# Patient Record
Sex: FEMALE | Race: OTHER | NOT HISPANIC OR LATINO | Employment: UNEMPLOYED | URBAN - METROPOLITAN AREA
[De-identification: names, ages, dates, MRNs, and addresses within clinical notes are randomized per-mention and may not be internally consistent; named-entity substitution may affect disease eponyms.]

---

## 2023-01-07 ENCOUNTER — HOSPITAL ENCOUNTER (EMERGENCY)
Facility: MEDICAL CENTER | Age: 48
End: 2023-01-07
Attending: EMERGENCY MEDICINE
Payer: COMMERCIAL

## 2023-01-07 VITALS
SYSTOLIC BLOOD PRESSURE: 128 MMHG | WEIGHT: 105 LBS | HEART RATE: 77 BPM | RESPIRATION RATE: 16 BRPM | DIASTOLIC BLOOD PRESSURE: 77 MMHG | OXYGEN SATURATION: 98 % | TEMPERATURE: 97.5 F

## 2023-01-07 DIAGNOSIS — G43.009 ATYPICAL MIGRAINE: ICD-10-CM

## 2023-01-07 DIAGNOSIS — R11.2 NAUSEA AND VOMITING, UNSPECIFIED VOMITING TYPE: ICD-10-CM

## 2023-01-07 DIAGNOSIS — R42 DIZZINESS: ICD-10-CM

## 2023-01-07 DIAGNOSIS — M54.2 NECK PAIN: ICD-10-CM

## 2023-01-07 LAB
ALBUMIN SERPL BCP-MCNC: 4.6 G/DL (ref 3.2–4.9)
ALBUMIN/GLOB SERPL: 1.7 G/DL
ALP SERPL-CCNC: 62 U/L (ref 30–99)
ALT SERPL-CCNC: 12 U/L (ref 2–50)
ANION GAP SERPL CALC-SCNC: 11 MMOL/L (ref 7–16)
AST SERPL-CCNC: 15 U/L (ref 12–45)
BASOPHILS # BLD AUTO: 1.3 % (ref 0–1.8)
BASOPHILS # BLD: 0.1 K/UL (ref 0–0.12)
BILIRUB SERPL-MCNC: 0.5 MG/DL (ref 0.1–1.5)
BUN SERPL-MCNC: 12 MG/DL (ref 8–22)
CALCIUM ALBUM COR SERPL-MCNC: 9 MG/DL (ref 8.5–10.5)
CALCIUM SERPL-MCNC: 9.5 MG/DL (ref 8.4–10.2)
CHLORIDE SERPL-SCNC: 105 MMOL/L (ref 96–112)
CO2 SERPL-SCNC: 23 MMOL/L (ref 20–33)
CREAT SERPL-MCNC: 0.65 MG/DL (ref 0.5–1.4)
EOSINOPHIL # BLD AUTO: 0.11 K/UL (ref 0–0.51)
EOSINOPHIL NFR BLD: 1.4 % (ref 0–6.9)
ERYTHROCYTE [DISTWIDTH] IN BLOOD BY AUTOMATED COUNT: 40.4 FL (ref 35.9–50)
GFR SERPLBLD CREATININE-BSD FMLA CKD-EPI: 109 ML/MIN/1.73 M 2
GLOBULIN SER CALC-MCNC: 2.7 G/DL (ref 1.9–3.5)
GLUCOSE SERPL-MCNC: 89 MG/DL (ref 65–99)
HCG SERPL QL: NEGATIVE
HCT VFR BLD AUTO: 38.1 % (ref 37–47)
HGB BLD-MCNC: 13.4 G/DL (ref 12–16)
IMM GRANULOCYTES # BLD AUTO: 0.01 K/UL (ref 0–0.11)
IMM GRANULOCYTES NFR BLD AUTO: 0.1 % (ref 0–0.9)
LIPASE SERPL-CCNC: 33 U/L (ref 7–58)
LYMPHOCYTES # BLD AUTO: 3.86 K/UL (ref 1–4.8)
LYMPHOCYTES NFR BLD: 49 % (ref 22–41)
MCH RBC QN AUTO: 31.3 PG (ref 27–33)
MCHC RBC AUTO-ENTMCNC: 35.2 G/DL (ref 33.6–35)
MCV RBC AUTO: 89 FL (ref 81.4–97.8)
MONOCYTES # BLD AUTO: 0.77 K/UL (ref 0–0.85)
MONOCYTES NFR BLD AUTO: 9.8 % (ref 0–13.4)
NEUTROPHILS # BLD AUTO: 3.02 K/UL (ref 2–7.15)
NEUTROPHILS NFR BLD: 38.4 % (ref 44–72)
NRBC # BLD AUTO: 0 K/UL
NRBC BLD-RTO: 0 /100 WBC
PLATELET # BLD AUTO: 321 K/UL (ref 164–446)
PMV BLD AUTO: 9.4 FL (ref 9–12.9)
POTASSIUM SERPL-SCNC: 3.5 MMOL/L (ref 3.6–5.5)
PROT SERPL-MCNC: 7.3 G/DL (ref 6–8.2)
RBC # BLD AUTO: 4.28 M/UL (ref 4.2–5.4)
SODIUM SERPL-SCNC: 139 MMOL/L (ref 135–145)
WBC # BLD AUTO: 7.9 K/UL (ref 4.8–10.8)

## 2023-01-07 PROCEDURE — 94760 N-INVAS EAR/PLS OXIMETRY 1: CPT

## 2023-01-07 PROCEDURE — 99284 EMERGENCY DEPT VISIT MOD MDM: CPT

## 2023-01-07 PROCEDURE — 36415 COLL VENOUS BLD VENIPUNCTURE: CPT

## 2023-01-07 PROCEDURE — 84703 CHORIONIC GONADOTROPIN ASSAY: CPT

## 2023-01-07 PROCEDURE — 83690 ASSAY OF LIPASE: CPT

## 2023-01-07 PROCEDURE — 96365 THER/PROPH/DIAG IV INF INIT: CPT

## 2023-01-07 PROCEDURE — 96375 TX/PRO/DX INJ NEW DRUG ADDON: CPT

## 2023-01-07 PROCEDURE — 80053 COMPREHEN METABOLIC PANEL: CPT

## 2023-01-07 PROCEDURE — 700105 HCHG RX REV CODE 258: Performed by: EMERGENCY MEDICINE

## 2023-01-07 PROCEDURE — 85025 COMPLETE CBC W/AUTO DIFF WBC: CPT

## 2023-01-07 PROCEDURE — 700111 HCHG RX REV CODE 636 W/ 250 OVERRIDE (IP): Performed by: EMERGENCY MEDICINE

## 2023-01-07 RX ORDER — PROCHLORPERAZINE MALEATE 10 MG
10 TABLET ORAL EVERY 6 HOURS PRN
Qty: 24 TABLET | Refills: 3 | Status: SHIPPED | OUTPATIENT
Start: 2023-01-07 | End: 2023-01-16

## 2023-01-07 RX ORDER — SODIUM CHLORIDE 9 MG/ML
1000 INJECTION, SOLUTION INTRAVENOUS ONCE
Status: COMPLETED | OUTPATIENT
Start: 2023-01-07 | End: 2023-01-07

## 2023-01-07 RX ORDER — KETOROLAC TROMETHAMINE 30 MG/ML
30 INJECTION, SOLUTION INTRAMUSCULAR; INTRAVENOUS ONCE
Status: COMPLETED | OUTPATIENT
Start: 2023-01-07 | End: 2023-01-07

## 2023-01-07 RX ORDER — PROCHLORPERAZINE EDISYLATE 5 MG/ML
10 INJECTION INTRAMUSCULAR; INTRAVENOUS ONCE
Status: COMPLETED | OUTPATIENT
Start: 2023-01-07 | End: 2023-01-07

## 2023-01-07 RX ORDER — KETOROLAC TROMETHAMINE 10 MG/1
10 TABLET, FILM COATED ORAL 3 TIMES DAILY PRN
Qty: 15 TABLET | Refills: 0 | Status: SHIPPED | OUTPATIENT
Start: 2023-01-07 | End: 2023-01-16

## 2023-01-07 RX ORDER — DIPHENHYDRAMINE HYDROCHLORIDE 50 MG/ML
50 INJECTION INTRAMUSCULAR; INTRAVENOUS ONCE
Status: COMPLETED | OUTPATIENT
Start: 2023-01-07 | End: 2023-01-07

## 2023-01-07 RX ADMIN — SODIUM CHLORIDE 1000 ML: 9 INJECTION, SOLUTION INTRAVENOUS at 20:45

## 2023-01-07 RX ADMIN — PROCHLORPERAZINE EDISYLATE 10 MG: 5 INJECTION INTRAMUSCULAR; INTRAVENOUS at 20:45

## 2023-01-07 RX ADMIN — KETOROLAC TROMETHAMINE 30 MG: 30 INJECTION, SOLUTION INTRAMUSCULAR at 20:45

## 2023-01-07 RX ADMIN — METHOCARBAMOL 1000 MG: 100 INJECTION INTRAMUSCULAR; INTRAVENOUS at 20:48

## 2023-01-07 RX ADMIN — DIPHENHYDRAMINE HYDROCHLORIDE 50 MG: 50 INJECTION INTRAMUSCULAR; INTRAVENOUS at 20:44

## 2023-01-07 ASSESSMENT — LIFESTYLE VARIABLES
CONSUMPTION TOTAL: INCOMPLETE
TOTAL SCORE: 0
HAVE YOU EVER FELT YOU SHOULD CUT DOWN ON YOUR DRINKING: NO
HAVE PEOPLE ANNOYED YOU BY CRITICIZING YOUR DRINKING: NO
EVER HAD A DRINK FIRST THING IN THE MORNING TO STEADY YOUR NERVES TO GET RID OF A HANGOVER: NO
DO YOU DRINK ALCOHOL: NO
EVER FELT BAD OR GUILTY ABOUT YOUR DRINKING: NO

## 2023-01-07 NOTE — Clinical Note
Discharge instructions with pt, pt verbalized understanding and provided with opportunity to ask questions, VSS, NAD, pt ambulatory with steady gait and aaox4

## 2023-01-08 NOTE — ED PROVIDER NOTES
"  ER Provider Note    Scribed for Sujatha Richter  by Osiris Canchola. 1/7/2023  8:08 PM    Primary Care Provider: No primary care provider noted.  Means of arrival: Walk-in  History obtained from: Patient    CHIEF COMPLAINT  Chief Complaint   Patient presents with    Headache     Pt  reports HA x 7 days     LIMITATION TO HISTORY   Select: : None    HPI  OUTSIDE HISTORIAN(S):  Select: None    EXTERNAL RECORDS REVIEWED  Select: Other None    Bita Chaudhry is a 47 y.o. female with a history of migraines who presents to the ED for a left sided migraine onset 7 days ago. Patent feels like \"someone is drilling into [her] temple.\" Her pain is one-sided. The worst migraines she has had before this as when she was bedridden but this current pain \"blows her previous migraines out of the water.\"  She describes that her body starts convulsing and she feels like she will black out because of her pain. She has associated left sided facial numbness and nausea, but denies fever. She has attempted to alleviate with tylenol, but has experienced no relief. She notes that she has a history of head injuries secondary to playing sports. Denies allergies to any medications.     ROS  Pertinent positives include left sided migraine, left sided facial numbness and nausea. Pertinent negatives include no fever.  All other systems reviewed and negative.      PAST MEDICAL HISTORY  Migraine headaches  Chronic neck and back pain    SURGICAL HISTORY  None noted     FAMILY HISTORY  None noted     SOCIAL HISTORY   None noted     CURRENT MEDICATIONS  No current outpatient medications     ALLERGIES  Patient has no allergy information on record.    PHYSICAL EXAM  Wt 49.2 kg (108 lb 7.5 oz)     Constitutional: Moderate to severe distress, Tearful, and Anxious. Patient is well developed, well nourished. Non-toxic appearing.   HENT: Normocephalic, atraumatic. Nose normal with no mucosal edema or drainage. Oropharynx moist without erythema or " exudates.  Eyes: Conjunctivus slightly erythematous on bilateral eyes with no drainage, PERRL, EOMI,   Neck: Supple with Normal range of motion in flexion, extension and lateral rotation. No tenderness along the bony prominences or paraspinal muscles.   Lymphatic: No lymphadenopathy noted.   Cardiovascular: Tachycardic heart rate and Regular rhythm. No murmur  Thorax & Lungs: Clear and equal breath sounds with good excursion. No respiratory distress  Abdomen: Bowel sounds normal in all four quadrants. Soft,nontender, no rebound , guarding, palpable masses.   Skin: Warm, Dry, No rashes.   Back: Tenderness in the left cervical paraspinal muscles extending into the left occipital region, No cervical, thoracic, or lumbosacral tenderness.   Extremities: Peripheral pulses 4/4   Musculoskeletal: Normal range of motion in all major joints. No tenderness to palpation or major deformities noted.   Neurologic: Subjective left facial paraesthesia with normal  strength, Alert & oriented x 3, Normal motor function, Normal sensory function, No lateralizing or focal deficits noted. DTR's 4/4 bilaterally.  Psychiatric: Affect odd, very anxious    DIAGNOSTIC STUDIES & PROCEDURES    Labs:   Results for orders placed or performed during the hospital encounter of 01/07/23   CBC WITH DIFFERENTIAL   Result Value Ref Range    WBC 7.9 4.8 - 10.8 K/uL    RBC 4.28 4.20 - 5.40 M/uL    Hemoglobin 13.4 12.0 - 16.0 g/dL    Hematocrit 38.1 37.0 - 47.0 %    MCV 89.0 81.4 - 97.8 fL    MCH 31.3 27.0 - 33.0 pg    MCHC 35.2 (H) 33.6 - 35.0 g/dL    RDW 40.4 35.9 - 50.0 fL    Platelet Count 321 164 - 446 K/uL    MPV 9.4 9.0 - 12.9 fL    Neutrophils-Polys 38.40 (L) 44.00 - 72.00 %    Lymphocytes 49.00 (H) 22.00 - 41.00 %    Monocytes 9.80 0.00 - 13.40 %    Eosinophils 1.40 0.00 - 6.90 %    Basophils 1.30 0.00 - 1.80 %    Immature Granulocytes 0.10 0.00 - 0.90 %    Nucleated RBC 0.00 /100 WBC    Neutrophils (Absolute) 3.02 2.00 - 7.15 K/uL    Lymphs  (Absolute) 3.86 1.00 - 4.80 K/uL    Monos (Absolute) 0.77 0.00 - 0.85 K/uL    Eos (Absolute) 0.11 0.00 - 0.51 K/uL    Baso (Absolute) 0.10 0.00 - 0.12 K/uL    Immature Granulocytes (abs) 0.01 0.00 - 0.11 K/uL    NRBC (Absolute) 0.00 K/uL   COMP METABOLIC PANEL   Result Value Ref Range    Sodium 139 135 - 145 mmol/L    Potassium 3.5 (L) 3.6 - 5.5 mmol/L    Chloride 105 96 - 112 mmol/L    Co2 23 20 - 33 mmol/L    Anion Gap 11.0 7.0 - 16.0    Glucose 89 65 - 99 mg/dL    Bun 12 8 - 22 mg/dL    Creatinine 0.65 0.50 - 1.40 mg/dL    Calcium 9.5 8.4 - 10.2 mg/dL    AST(SGOT) 15 12 - 45 U/L    ALT(SGPT) 12 2 - 50 U/L    Alkaline Phosphatase 62 30 - 99 U/L    Total Bilirubin 0.5 0.1 - 1.5 mg/dL    Albumin 4.6 3.2 - 4.9 g/dL    Total Protein 7.3 6.0 - 8.2 g/dL    Globulin 2.7 1.9 - 3.5 g/dL    A-G Ratio 1.7 g/dL   LIPASE   Result Value Ref Range    Lipase 33 7 - 58 U/L   HCG QUAL SERUM   Result Value Ref Range    Beta-Hcg Qualitative Serum Negative Negative   CORRECTED CALCIUM   Result Value Ref Range    Correct Calcium 9.0 8.5 - 10.5 mg/dL   ESTIMATED GFR   Result Value Ref Range    GFR (CKD-EPI) 109 >60 mL/min/1.73 m 2     All labs reviewed by me.       COURSE & MEDICAL DECISION MAKING    8:08 PM - Patient seen and evaluated at bedside. I informed the patient I will order labs to evaluate and medications to treat. Patient verbalizes understanding and agreement to this plan of care. Patient will be treated with NS infusion 1,000 mL, Robaxin 1,000 mg in  mL IVPB, Toradol 30 mg via injection, Compazine 10 mg via injection, and Benadryl 50 mg via injection for her symptoms. Ordered Lipase, CMP, and CBC w/ Diff to evaluate. Differential diagnoses include but are not limited to: migraine, atypical migraine, tension head ache     8:30 PM - Patient notes she is feeling much better and has finally been able to sleep with treatment.    9:58 PM - Patient notes she feels better but cannot give a percentage of how much she feels  better. I informed her of plans for discharge. Patient had the opportunity to ask any questions. The plan for discharge was discussed with them and she was told to return for any new or worsening symptoms. She was also informed of the plans for follow up. Patient is understanding and agreeable to the plan for discharge.     ED Observation Status? No; Patient does not meet criteria for ED Observation.     INITIAL ASSESSMENT AND PLAN  Care Narrative: Patient received an IV with migraine cocktail including Benadryl, Compazine, Toradol and Robaxin.  Her laboratories were drawn to rule out any type of liver injury secondary to too much recent Tylenol ingestion.  Her labs were all unremarkable.  She was able to fall asleep and was resting comfortably.  Her headache was completely gone upon recheck and she will be discharged home with prescriptions for Toradol and Compazine.  She is also to call and get an appointment with a primary care physician for further evaluation and treatment.  She is discharged in stable and improved condition.    ADDITIONAL PROBLEM LIST AND DISPOSITION      Decision tools and prescription drugs considered including, but not limited to: Antiemetics and anti-inflammatories    HYDRATION: Based on the patient's presentation of Acute Vomiting the patient was given IV fluids. IV Hydration was used because oral hydration was not adequate alone. Upon recheck following hydration, the patient was improved.    The patient will return for new or worsening symptoms and is stable at the time of discharge.    The patient is referred to a primary physician for blood pressure management, diabetic screening, and for all other preventative health concerns.    DISPOSITION:  Patient will be discharged home in stable condition.    FOLLOW UP:  [facility]  [address]  [place] [address]  [phone]  Schedule an appointment as soon as possible for a visit in 2 days  To establish a primary  care physician      OUTPATIENT MEDICATIONS:  Toradol, Compazine       FINAL IMPRESSION   1. Atypical migraine    2. Dizziness    3. Neck pain    4. Nausea and vomiting, unspecified vomiting type         I, Osiris Canchola (Leena), am scribing for, and in the presence of, Sujatha Richter D.O..    Electronically signed by: Osiris Canchola (Scribe), 1/7/2023    I, Sujatha Richter D.O. personally performed the services described in this documentation, as scribed by Osiris Canchola in my presence, and it is both accurate and complete. C    The note accurately reflects work and decisions made by me.  Sujatha Richter D.O.  1/8/2023  12:00 AM

## 2023-01-08 NOTE — DISCHARGE INSTRUCTIONS
Migraine headaches can often times cause strokelike symptoms.  Please take the medications I will be prescribing you as directed with food and as needed  Call first thing Monday morning to schedule an appointment with a primary care provider so that you have follow-up should things worsen.  And for further medications.  Make sure that you are getting plenty of rest, increase fluids, use Biofreeze and moist heat to your neck  Return if worsening.

## 2023-01-13 ENCOUNTER — TELEPHONE (OUTPATIENT)
Dept: SCHEDULING | Facility: IMAGING CENTER | Age: 48
End: 2023-01-13

## 2023-01-16 ENCOUNTER — HOSPITAL ENCOUNTER (OUTPATIENT)
Dept: RADIOLOGY | Facility: MEDICAL CENTER | Age: 48
End: 2023-01-16
Attending: STUDENT IN AN ORGANIZED HEALTH CARE EDUCATION/TRAINING PROGRAM
Payer: COMMERCIAL

## 2023-01-16 ENCOUNTER — OFFICE VISIT (OUTPATIENT)
Dept: MEDICAL GROUP | Facility: MEDICAL CENTER | Age: 48
End: 2023-01-16
Payer: COMMERCIAL

## 2023-01-16 VITALS
SYSTOLIC BLOOD PRESSURE: 110 MMHG | TEMPERATURE: 97.9 F | HEIGHT: 60 IN | HEART RATE: 85 BPM | DIASTOLIC BLOOD PRESSURE: 70 MMHG | OXYGEN SATURATION: 98 % | RESPIRATION RATE: 17 BRPM | BODY MASS INDEX: 21.3 KG/M2 | WEIGHT: 108.47 LBS

## 2023-01-16 DIAGNOSIS — R13.19 OTHER DYSPHAGIA: ICD-10-CM

## 2023-01-16 DIAGNOSIS — R26.89 BALANCE PROBLEMS: ICD-10-CM

## 2023-01-16 DIAGNOSIS — Z80.41 FAMILY HISTORY OF OVARIAN CANCER: ICD-10-CM

## 2023-01-16 DIAGNOSIS — B97.7 HPV IN FEMALE: ICD-10-CM

## 2023-01-16 DIAGNOSIS — G89.29 OTHER CHRONIC PAIN: ICD-10-CM

## 2023-01-16 DIAGNOSIS — G43.109 MIGRAINE WITH AURA AND WITHOUT STATUS MIGRAINOSUS, NOT INTRACTABLE: ICD-10-CM

## 2023-01-16 DIAGNOSIS — H53.9 VISION CHANGES: ICD-10-CM

## 2023-01-16 DIAGNOSIS — Z00.00 ENCOUNTER FOR MEDICAL EXAMINATION TO ESTABLISH CARE: ICD-10-CM

## 2023-01-16 PROCEDURE — 700117 HCHG RX CONTRAST REV CODE 255: Performed by: STUDENT IN AN ORGANIZED HEALTH CARE EDUCATION/TRAINING PROGRAM

## 2023-01-16 PROCEDURE — 70553 MRI BRAIN STEM W/O & W/DYE: CPT

## 2023-01-16 PROCEDURE — 99204 OFFICE O/P NEW MOD 45 MIN: CPT | Performed by: STUDENT IN AN ORGANIZED HEALTH CARE EDUCATION/TRAINING PROGRAM

## 2023-01-16 PROCEDURE — A9579 GAD-BASE MR CONTRAST NOS,1ML: HCPCS | Performed by: STUDENT IN AN ORGANIZED HEALTH CARE EDUCATION/TRAINING PROGRAM

## 2023-01-16 RX ORDER — AMOXICILLIN 500 MG/1
CAPSULE ORAL
COMMUNITY
Start: 2023-01-03 | End: 2023-01-16

## 2023-01-16 RX ADMIN — GADOTERIDOL 10 ML: 279.3 INJECTION, SOLUTION INTRAVENOUS at 15:54

## 2023-01-16 ASSESSMENT — FIBROSIS 4 INDEX: FIB4 SCORE: 0.62

## 2023-01-16 ASSESSMENT — PATIENT HEALTH QUESTIONNAIRE - PHQ9: CLINICAL INTERPRETATION OF PHQ2 SCORE: 2
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
## 2023-01-17 ENCOUNTER — TELEPHONE (OUTPATIENT)
Dept: MEDICAL GROUP | Facility: MEDICAL CENTER | Age: 48
End: 2023-01-17
Payer: COMMERCIAL

## 2023-01-17 NOTE — TELEPHONE ENCOUNTER
----- Message from Joana Joe M.D. sent at 1/17/2023  6:51 AM PST -----  Please let patient know that I got the results of her MRI brain with and without contrast.  There are some small abnormalities that are nonspecific.  This could be related to small areas of damage due to blood vessel issues, could be related to chronic migraines or headache syndromes, or other nonspecific issues.  Otherwise it is normal.  I would make sure she is able to go over this with her neurologist to get a better idea of what this means.    Thank You,  Dr. Joe

## 2023-01-17 NOTE — TELEPHONE ENCOUNTER
Phone Number Called: [phone] (home)      Call outcome: Spoke to patient regarding message below.    Message: spoke to PT and informed her the results of her MRI brain with and without contrast.  There are some small abnormalities that are nonspecific.  This could be related to small areas of damage due to blood vessel issues, could be related to chronic migraines or headache syndromes, or other nonspecific issues.  Otherwise it is normal.

## 2023-01-24 ENCOUNTER — TELEPHONE (OUTPATIENT)
Dept: NEUROLOGY | Facility: MEDICAL CENTER | Age: 48
End: 2023-01-24
Payer: COMMERCIAL

## 2023-01-31 ENCOUNTER — TELEPHONE (OUTPATIENT)
Dept: NEUROLOGY | Facility: MEDICAL CENTER | Age: 48
End: 2023-01-31
Payer: COMMERCIAL

## 2023-02-03 ENCOUNTER — TELEPHONE (OUTPATIENT)
Dept: NEUROLOGY | Facility: MEDICAL CENTER | Age: 48
End: 2023-02-03
Payer: COMMERCIAL

## 2023-02-06 ENCOUNTER — OFFICE VISIT (OUTPATIENT)
Dept: NEUROLOGY | Facility: MEDICAL CENTER | Age: 48
End: 2023-02-06
Attending: PSYCHIATRY & NEUROLOGY
Payer: COMMERCIAL

## 2023-02-06 VITALS
OXYGEN SATURATION: 98 % | TEMPERATURE: 97.4 F | BODY MASS INDEX: 21.1 KG/M2 | SYSTOLIC BLOOD PRESSURE: 124 MMHG | WEIGHT: 108.03 LBS | DIASTOLIC BLOOD PRESSURE: 76 MMHG

## 2023-02-06 DIAGNOSIS — G95.9 DISEASE OF SPINAL CORD (HCC): ICD-10-CM

## 2023-02-06 DIAGNOSIS — R90.82 WHITE MATTER ABNORMALITY ON MRI OF BRAIN: ICD-10-CM

## 2023-02-06 DIAGNOSIS — R56.9 SEIZURE (HCC): ICD-10-CM

## 2023-02-06 DIAGNOSIS — G43.109 MIGRAINE WITH AURA AND WITHOUT STATUS MIGRAINOSUS, NOT INTRACTABLE: ICD-10-CM

## 2023-02-06 DIAGNOSIS — S09.90XS HEAD TRAUMA, SEQUELA: ICD-10-CM

## 2023-02-06 DIAGNOSIS — H53.2 DIPLOPIA: ICD-10-CM

## 2023-02-06 DIAGNOSIS — R13.10 DYSPHAGIA, UNSPECIFIED TYPE: ICD-10-CM

## 2023-02-06 DIAGNOSIS — R26.89 BALANCE PROBLEMS: ICD-10-CM

## 2023-02-06 DIAGNOSIS — N32.81 OVERACTIVE BLADDER: ICD-10-CM

## 2023-02-06 PROCEDURE — 99211 OFF/OP EST MAY X REQ PHY/QHP: CPT | Performed by: PSYCHIATRY & NEUROLOGY

## 2023-02-06 PROCEDURE — 99205 OFFICE O/P NEW HI 60 MIN: CPT | Performed by: PSYCHIATRY & NEUROLOGY

## 2023-02-06 ASSESSMENT — FIBROSIS 4 INDEX: FIB4 SCORE: 0.62

## 2023-02-06 NOTE — ASSESSMENT & PLAN NOTE
Patient with left facial numbness with her headaches. Pt takes OTC tylenol and ibuprofen and drinking water. She has taken sumatriptan in the past and she had bad SE. Pt uses some CBD and herbal remedies.

## 2023-02-06 NOTE — ASSESSMENT & PLAN NOTE
Pt has swallowing issues with all food that used to come and go but is constant now.  Patient states she has lost weight as she has had difficulty with any food that is difficult to chew and swallow.  Never had a swallowing study.

## 2023-02-06 NOTE — ASSESSMENT & PLAN NOTE
Pt states that she has had 3 events that she felt a strange feeling and she had muscle spasms and clawing in her hand.
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
## 2023-02-07 NOTE — ASSESSMENT & PLAN NOTE
Patient had a recent scan for severe headaches and imbalance issues in January 2023 which did show white matter changes which need to be further investigated.
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
## 2023-02-17 ENCOUNTER — TELEPHONE (OUTPATIENT)
Dept: NEUROLOGY | Facility: MEDICAL CENTER | Age: 48
End: 2023-02-17
Payer: COMMERCIAL

## 2023-02-17 NOTE — TELEPHONE ENCOUNTER
VOICEMAIL  1. Caller Name: Jessy ALDANA                      Call Back Number: [phone]    2. Message: Patient is wanting a referral to [facility] neurology in [place] with Meena Dang due to issues with her insurance not being covered in [place]. Please let me know once done so I can let the patient know.   Thank you!  -DANIEL Alas

## 2023-02-18 DIAGNOSIS — R90.82 WHITE MATTER ABNORMALITY ON MRI OF BRAIN: ICD-10-CM

## 2023-02-19 ENCOUNTER — APPOINTMENT (OUTPATIENT)
Dept: RADIOLOGY | Facility: MEDICAL CENTER | Age: 48
End: 2023-02-19
Attending: PSYCHIATRY & NEUROLOGY
Payer: COMMERCIAL

## 2023-04-25 ENCOUNTER — TELEPHONE (OUTPATIENT)
Dept: NEUROLOGY | Facility: MEDICAL CENTER | Age: 48
End: 2023-04-25
Payer: COMMERCIAL

## 2023-04-25 NOTE — TELEPHONE ENCOUNTER
[phone]  Reina from [facility] called states pt was referred to [facility] and they denied the referral because they don't have enough staff.   Asking if you would know where else this pt can be sent to for her MS.   Has tried contacting pt, however she is not responding calls.  Please advise

## 2023-05-16 DIAGNOSIS — R90.82 WHITE MATTER ABNORMALITY ON MRI OF BRAIN: ICD-10-CM

## 2024-04-22 ENCOUNTER — HOSPITAL ENCOUNTER (EMERGENCY)
Facility: MEDICAL CENTER | Age: 49
End: 2024-04-22
Attending: EMERGENCY MEDICINE
Payer: COMMERCIAL

## 2024-04-22 ENCOUNTER — APPOINTMENT (OUTPATIENT)
Dept: RADIOLOGY | Facility: MEDICAL CENTER | Age: 49
End: 2024-04-22
Attending: EMERGENCY MEDICINE
Payer: COMMERCIAL

## 2024-04-22 VITALS
DIASTOLIC BLOOD PRESSURE: 70 MMHG | HEART RATE: 115 BPM | SYSTOLIC BLOOD PRESSURE: 102 MMHG | HEIGHT: 60 IN | OXYGEN SATURATION: 98 % | RESPIRATION RATE: 18 BRPM | TEMPERATURE: 98.5 F | WEIGHT: 104.72 LBS | BODY MASS INDEX: 20.56 KG/M2

## 2024-04-22 PROCEDURE — 99283 EMERGENCY DEPT VISIT LOW MDM: CPT

## 2024-04-22 PROCEDURE — 700102 HCHG RX REV CODE 250 W/ 637 OVERRIDE(OP): Performed by: EMERGENCY MEDICINE

## 2024-04-22 PROCEDURE — A9270 NON-COVERED ITEM OR SERVICE: HCPCS | Performed by: EMERGENCY MEDICINE

## 2024-04-22 PROCEDURE — 73110 X-RAY EXAM OF WRIST: CPT | Mod: RT

## 2024-04-22 RX ORDER — GABAPENTIN 100 MG/1
200 CAPSULE ORAL ONCE
Status: COMPLETED | OUTPATIENT
Start: 2024-04-22 | End: 2024-04-22

## 2024-04-22 RX ORDER — MELOXICAM 7.5 MG/1
7.5 TABLET ORAL ONCE
Status: COMPLETED | OUTPATIENT
Start: 2024-04-22 | End: 2024-04-22

## 2024-04-22 RX ADMIN — GABAPENTIN 200 MG: 100 CAPSULE ORAL at 21:15

## 2024-04-22 RX ADMIN — MELOXICAM 7.5 MG: 7.5 TABLET ORAL at 21:15

## 2024-04-22 ASSESSMENT — FIBROSIS 4 INDEX: FIB4 SCORE: 0.63

## 2024-04-23 NOTE — ED PROVIDER NOTES
ED Provider Note    CHIEF COMPLAINT  Chief Complaint   Patient presents with    Wrist Pain     Right wrist pain. Atraumatic. Patient reports previous surgery and nerve damage at site.          HPI/ROS    Bita Chaudhry is a 48 y.o. female who presents with right wrist pain.  Patient reports longstanding history of chronic pain in the wrist.  Patient reports that she has had pain to the right wrist for 3 years now.  She broke her wrist and had some pins placed years ago and since then the pain has been present.  She reports the pain is progressively worsened over the last few years, she does not have a primary care physician therefore came to the emergency department for this.  She does have a neurologist that she has a history of seizures.  She also has some chronic muscle spasms for which she is on muscle relaxers.  Patient denies any new weakness or numbness of her hand.  She denies any fevers or chills.  She denies any recent trauma.    PAST MEDICAL HISTORY       SURGICAL HISTORY  patient denies any surgical history    FAMILY HISTORY  Family History   Problem Relation Age of Onset    Ovarian Cancer Mother     Cancer Father     Diabetes Paternal Uncle     Tubal Cancer Neg Hx     Peritoneal Cancer Neg Hx     Colorectal Cancer Neg Hx     Breast Cancer Neg Hx        SOCIAL HISTORY  Social History     Tobacco Use    Smoking status: Never    Smokeless tobacco: Never   Vaping Use    Vaping Use: Never used   Substance and Sexual Activity    Alcohol use: Not Currently     Comment: occ    Drug use: Yes     Types: Marijuana    Sexual activity: Yes     Partners: Male     Birth control/protection: Coitus Interruptus       CURRENT MEDICATIONS  Home Medications       Reviewed by Juan Antonio Lynch R.N. (Registered Nurse) on 04/22/24 at 2001  Med List Status: Not Addressed     Medication Last Dose Status        Patient Kj Taking any Medications                           ALLERGIES  No Known Allergies    PHYSICAL  EXAM  VITAL SIGNS: /70   Pulse (!) 115   Temp 36.9 °C (98.5 °F) (Temporal)   Resp 18   Ht 1.524 m (5')   Wt 47.5 kg (104 lb 11.5 oz)   LMP 01/17/2024 (Approximate)   SpO2 98%   BMI 20.45 kg/m²    Physical Exam  Constitutional:       Appearance: Normal appearance.   HENT:      Mouth/Throat:      Mouth: Mucous membranes are moist.   Pulmonary:      Effort: Pulmonary effort is normal.   Musculoskeletal:      Comments: Mild ttp at R wrist, FROM of wrist without much pain revoked, no increased warmth or overlying skin changes. distal pulses 2+   Neurological:      General: No focal deficit present.      Mental Status: She is alert and oriented to person, place, and time.   Psychiatric:         Mood and Affect: Mood normal.             RADIOLOGY/PROCEDURES   I have independently interpreted the diagnostic imaging associated with this visit and am waiting the final reading from the radiologist.   My preliminary interpretation is as follows: No evidence of concerning findings    Radiologist interpretation:  DX-WRIST-COMPLETE 3+ RIGHT   Final Result         1.  No acute traumatic bony injury.            COURSE & MEDICAL DECISION MAKING    ASSESSMENT, COURSE AND PLAN  Care Narrative: Patient here with chronic wrist pain.  Will check x-rays to help facilitate further workup.  My suspicion of septic arthritis is very low given patient with full range of motion of her wrist.  No increased warmth intravenously of her symptoms.  I checked an x-ray, plan was to have patient follow-up with orthopedics and send her home with some Mobic.  Shortly after the x-ray was completed patient absconded from the emergency department thankfully x-ray failed to reveal any concerning findings            DISPOSITION AND DISCUSSIONS      Escalation of care considered, and ultimately not performed: Patient currently does not have a primary care physician though is scheduled to establish  with 1 in 2 weeks    Barriers to care at this  time, including but not limited to: Left AGAINST MEDICAL ADVICE prior to being able to complete the workup       FINAL DIAGNOSIS  Chronic wrist pain

## 2024-04-23 NOTE — ED TRIAGE NOTES
Patient presents to the ER with the following complaints:    Chief Complaint   Patient presents with    Wrist Pain     Right wrist pain. Atraumatic. Patient reports previous surgery and nerve damage at site.        /70   Pulse (!) 115   Temp 36.9 °C (98.5 °F) (Temporal)   Resp 18   Ht 1.524 m (5')   Wt 47.5 kg (104 lb 11.5 oz)   LMP 01/17/2024 (Approximate)   SpO2 98%   BMI 20.45 kg/m²

## 2024-08-26 ENCOUNTER — APPOINTMENT (OUTPATIENT)
Dept: NEUROLOGY | Facility: MEDICAL CENTER | Age: 49
End: 2024-08-26
Attending: PSYCHIATRY & NEUROLOGY
Payer: COMMERCIAL

## 2024-09-13 ENCOUNTER — HOSPITAL ENCOUNTER (OUTPATIENT)
Facility: MEDICAL CENTER | Age: 49
End: 2024-09-13
Attending: EMERGENCY MEDICINE | Admitting: INTERNAL MEDICINE
Payer: COMMERCIAL

## 2024-09-13 ENCOUNTER — APPOINTMENT (OUTPATIENT)
Dept: RADIOLOGY | Facility: MEDICAL CENTER | Age: 49
End: 2024-09-13
Attending: EMERGENCY MEDICINE
Payer: COMMERCIAL

## 2024-09-13 VITALS
WEIGHT: 102.73 LBS | HEART RATE: 88 BPM | RESPIRATION RATE: 20 BRPM | TEMPERATURE: 98.7 F | BODY MASS INDEX: 20.17 KG/M2 | OXYGEN SATURATION: 98 % | HEIGHT: 60 IN | DIASTOLIC BLOOD PRESSURE: 89 MMHG | SYSTOLIC BLOOD PRESSURE: 134 MMHG

## 2024-09-13 DIAGNOSIS — R13.10 DYSPHAGIA, UNSPECIFIED TYPE: ICD-10-CM

## 2024-09-13 DIAGNOSIS — R07.9 ACUTE CHEST PAIN: ICD-10-CM

## 2024-09-13 LAB
ALBUMIN SERPL BCP-MCNC: 4.3 G/DL (ref 3.2–4.9)
ALBUMIN/GLOB SERPL: 1.6 G/DL
ALP SERPL-CCNC: 63 U/L (ref 30–99)
ALT SERPL-CCNC: 8 U/L (ref 2–50)
ANION GAP SERPL CALC-SCNC: 13 MMOL/L (ref 7–16)
AST SERPL-CCNC: 15 U/L (ref 12–45)
BASOPHILS # BLD AUTO: 1.4 % (ref 0–1.8)
BASOPHILS # BLD: 0.12 K/UL (ref 0–0.12)
BILIRUB SERPL-MCNC: 0.7 MG/DL (ref 0.1–1.5)
BUN SERPL-MCNC: 9 MG/DL (ref 8–22)
CALCIUM ALBUM COR SERPL-MCNC: 9 MG/DL (ref 8.5–10.5)
CALCIUM SERPL-MCNC: 9.2 MG/DL (ref 8.4–10.2)
CHLORIDE SERPL-SCNC: 101 MMOL/L (ref 96–112)
CO2 SERPL-SCNC: 23 MMOL/L (ref 20–33)
CREAT SERPL-MCNC: 0.57 MG/DL (ref 0.5–1.4)
EKG IMPRESSION: NORMAL
EOSINOPHIL # BLD AUTO: 0.15 K/UL (ref 0–0.51)
EOSINOPHIL NFR BLD: 1.7 % (ref 0–6.9)
ERYTHROCYTE [DISTWIDTH] IN BLOOD BY AUTOMATED COUNT: 37.8 FL (ref 35.9–50)
GFR SERPLBLD CREATININE-BSD FMLA CKD-EPI: 112 ML/MIN/1.73 M 2
GLOBULIN SER CALC-MCNC: 2.7 G/DL (ref 1.9–3.5)
GLUCOSE SERPL-MCNC: 87 MG/DL (ref 65–99)
HCG SERPL QL: NEGATIVE
HCT VFR BLD AUTO: 36.9 % (ref 37–47)
HGB BLD-MCNC: 13.3 G/DL (ref 12–16)
IMM GRANULOCYTES # BLD AUTO: 0.02 K/UL (ref 0–0.11)
IMM GRANULOCYTES NFR BLD AUTO: 0.2 % (ref 0–0.9)
LIPASE SERPL-CCNC: 25 U/L (ref 11–82)
LYMPHOCYTES # BLD AUTO: 3.75 K/UL (ref 1–4.8)
LYMPHOCYTES NFR BLD: 42.3 % (ref 22–41)
MCH RBC QN AUTO: 32 PG (ref 27–33)
MCHC RBC AUTO-ENTMCNC: 36 G/DL (ref 32.2–35.5)
MCV RBC AUTO: 88.7 FL (ref 81.4–97.8)
MONOCYTES # BLD AUTO: 0.66 K/UL (ref 0–0.85)
MONOCYTES NFR BLD AUTO: 7.4 % (ref 0–13.4)
NEUTROPHILS # BLD AUTO: 4.16 K/UL (ref 1.82–7.42)
NEUTROPHILS NFR BLD: 47 % (ref 44–72)
NRBC # BLD AUTO: 0 K/UL
NRBC BLD-RTO: 0 /100 WBC (ref 0–0.2)
PLATELET # BLD AUTO: 402 K/UL (ref 164–446)
PMV BLD AUTO: 9.3 FL (ref 9–12.9)
POTASSIUM SERPL-SCNC: 3.7 MMOL/L (ref 3.6–5.5)
PROT SERPL-MCNC: 7 G/DL (ref 6–8.2)
RBC # BLD AUTO: 4.16 M/UL (ref 4.2–5.4)
SODIUM SERPL-SCNC: 137 MMOL/L (ref 135–145)
WBC # BLD AUTO: 8.9 K/UL (ref 4.8–10.8)

## 2024-09-13 PROCEDURE — 700105 HCHG RX REV CODE 258: Performed by: EMERGENCY MEDICINE

## 2024-09-13 PROCEDURE — 93005 ELECTROCARDIOGRAM TRACING: CPT

## 2024-09-13 PROCEDURE — 84703 CHORIONIC GONADOTROPIN ASSAY: CPT

## 2024-09-13 PROCEDURE — 36415 COLL VENOUS BLD VENIPUNCTURE: CPT

## 2024-09-13 PROCEDURE — 99222 1ST HOSP IP/OBS MODERATE 55: CPT | Performed by: INTERNAL MEDICINE

## 2024-09-13 PROCEDURE — 71045 X-RAY EXAM CHEST 1 VIEW: CPT

## 2024-09-13 PROCEDURE — 80053 COMPREHEN METABOLIC PANEL: CPT

## 2024-09-13 PROCEDURE — 83690 ASSAY OF LIPASE: CPT

## 2024-09-13 PROCEDURE — 85025 COMPLETE CBC W/AUTO DIFF WBC: CPT

## 2024-09-13 PROCEDURE — 93005 ELECTROCARDIOGRAM TRACING: CPT | Performed by: EMERGENCY MEDICINE

## 2024-09-13 PROCEDURE — 99284 EMERGENCY DEPT VISIT MOD MDM: CPT

## 2024-09-13 PROCEDURE — G0378 HOSPITAL OBSERVATION PER HR: HCPCS

## 2024-09-13 RX ORDER — CYCLOBENZAPRINE HCL 10 MG
20 TABLET ORAL 2 TIMES DAILY PRN
COMMUNITY

## 2024-09-13 RX ORDER — SODIUM CHLORIDE, SODIUM LACTATE, POTASSIUM CHLORIDE, CALCIUM CHLORIDE 600; 310; 30; 20 MG/100ML; MG/100ML; MG/100ML; MG/100ML
1000 INJECTION, SOLUTION INTRAVENOUS ONCE
Status: COMPLETED | OUTPATIENT
Start: 2024-09-13 | End: 2024-09-13

## 2024-09-13 RX ORDER — HYDROXYZINE HYDROCHLORIDE 25 MG/1
25 TABLET, FILM COATED ORAL EVERY 6 HOURS PRN
COMMUNITY

## 2024-09-13 RX ORDER — GABAPENTIN 300 MG/1
300 CAPSULE ORAL 3 TIMES DAILY PRN
COMMUNITY

## 2024-09-13 RX ORDER — LORAZEPAM 1 MG/1
1 TABLET ORAL 2 TIMES DAILY PRN
COMMUNITY

## 2024-09-13 RX ADMIN — SODIUM CHLORIDE, POTASSIUM CHLORIDE, SODIUM LACTATE AND CALCIUM CHLORIDE 1000 ML: 600; 310; 30; 20 INJECTION, SOLUTION INTRAVENOUS at 20:14

## 2024-09-13 ASSESSMENT — ENCOUNTER SYMPTOMS
ROS GI COMMENTS: DIFFICULTY SWALLOWING
SPEECH CHANGE: 1
VOMITING: 0
SPUTUM PRODUCTION: 0
ABDOMINAL PAIN: 0
HEADACHES: 0
DIZZINESS: 0
SENSORY CHANGE: 1
CONSTIPATION: 0
CHILLS: 0
NAUSEA: 0
COUGH: 0
DEPRESSION: 0
TINGLING: 0
PALPITATIONS: 0
FALLS: 0
DIARRHEA: 0
MYALGIAS: 0
LOSS OF CONSCIOUSNESS: 0
STRIDOR: 0
SHORTNESS OF BREATH: 0
FEVER: 0
WEAKNESS: 1

## 2024-09-13 ASSESSMENT — FIBROSIS 4 INDEX: FIB4 SCORE: 0.65

## 2024-09-14 PROBLEM — R53.1 WEAKNESS: Status: ACTIVE | Noted: 2024-09-14

## 2024-09-14 NOTE — CONSULTS
Hospital Medicine Consultation    Date of Service  9/13/2024    Referring Physician  Km Banda D.O.    Consulting Physician  Km Banda D.O.    Reason for Consultation  Admission for dysphagia    History of Presenting Illness  49 y.o. female who presented 9/13/2024 with ongoing dysphagia and concern for aspiration.  Patient began developing dysphagia approximately 7 years ago.  She did have a barium swallow 1 year ago and was officially diagnosed with dysphagia.  She states she has not been given a diagnosis as to why.  She has seen neurology, initially MS was looked at.  She states she has an appointment next week to see a specialist and myasthenia gravis.  Patient states her insurance is in [place] which is where she seeks most of her care.  She came into the ER today for concerns of aspiration.  She states 2 days ago she have difficulty swallowing and then began coughing what she had tried to eat, also noted it coming out of her nose.  After having a long discussion about her case with her, she decided she wanted to see her neurologist first prior to any endoscopy.  She does believe this is secondary to a neurologic issue.  Patient did not want to be admitted, since she was not interested in an EGD, there was no need for admission.  I did discuss the case including labs and imaging with the ER physician    Review of Systems  Review of Systems   Constitutional:  Negative for chills, fever and malaise/fatigue.   HENT:  Negative for congestion.    Respiratory:  Negative for cough, sputum production, shortness of breath and stridor.    Cardiovascular:  Positive for chest pain. Negative for palpitations and leg swelling.   Gastrointestinal:  Negative for abdominal pain, constipation, diarrhea, nausea and vomiting.        Difficulty swallowing    Genitourinary:  Negative for dysuria and urgency.   Musculoskeletal:  Negative for falls and myalgias.   Neurological:  Positive for sensory change, speech  change and weakness. Negative for dizziness, tingling, loss of consciousness and headaches.   Psychiatric/Behavioral:  Negative for depression and suicidal ideas.    All other systems reviewed and are negative.      Past Medical History   has a past medical history of Dysphagia.    Surgical History   has no past surgical history on file.    Family History  family history includes Cancer in her father; Diabetes in her paternal uncle; Ovarian Cancer in her mother.    Social History   reports that she has never smoked. She has never used smokeless tobacco. She reports that she does not currently use alcohol. She reports current drug use. Drug: Marijuana.    Medications  Prior to Admission Medications   Prescriptions Last Dose Informant Patient Reported? Taking?   LORazepam (ATIVAN) 1 MG Tab > 1 week ago  Yes Yes   Sig: Take 1 mg by mouth 2 times a day as needed for Anxiety.   cyclobenzaprine (FLEXERIL) 10 mg Tab 9/12/2024  Yes Yes   Sig: Take 20 mg by mouth 2 times a day as needed for Mild Pain, Moderate Pain or Muscle Spasms.   gabapentin (NEURONTIN) 300 MG Cap 9/13/2024  Yes Yes   Sig: Take 300 mg by mouth 3 times a day as needed (pain, muscle spasms). Titrates based on how she feels, takes up to 1800 mg/ day   hydrOXYzine HCl (ATARAX) 25 MG Tab prn at prn  Yes Yes   Sig: Take 25 mg by mouth every 6 hours as needed for Anxiety (sleep).      Facility-Administered Medications: None       Allergies  Allergies   Allergen Reactions    Percocet [Perloxx]      Nausea/ vomiting       Physical Exam  Temp:  [37.1 °C (98.7 °F)] 37.1 °C (98.7 °F)  Pulse:  [] 88  Resp:  [18-20] 20  BP: (133-134)/(89-94) 134/89  SpO2:  [96 %-98 %] 98 %    Physical Exam  Vitals and nursing note reviewed.   Constitutional:       General: She is not in acute distress.     Appearance: She is well-developed. She is not diaphoretic.   HENT:      Head: Normocephalic and atraumatic.      Right Ear: External ear normal.      Left Ear: External ear  normal.      Nose: Nose normal. No congestion or rhinorrhea.      Mouth/Throat:      Mouth: Mucous membranes are dry.      Pharynx: No oropharyngeal exudate.   Eyes:      General:         Right eye: No discharge.         Left eye: No discharge.   Neck:      Trachea: No tracheal deviation.   Cardiovascular:      Rate and Rhythm: Regular rhythm. Tachycardia present.   Pulmonary:      Effort: Pulmonary effort is normal. No respiratory distress.   Abdominal:      General: Abdomen is flat.      Palpations: Abdomen is soft.   Musculoskeletal:         General: Normal range of motion.      Cervical back: Neck supple.      Right lower leg: No edema.      Left lower leg: No edema.   Lymphadenopathy:      Cervical: No cervical adenopathy.   Skin:     General: Skin is warm and dry.      Findings: No erythema or rash.   Neurological:      Mental Status: She is alert and oriented to person, place, and time.      Cranial Nerves: No cranial nerve deficit.   Psychiatric:         Mood and Affect: Mood normal.         Behavior: Behavior normal.         Thought Content: Thought content normal.         Judgment: Judgment normal.         Fluids      Laboratory  Recent Labs     09/13/24 1937   WBC 8.9   RBC 4.16*   HEMOGLOBIN 13.3   HEMATOCRIT 36.9*   MCV 88.7   MCH 32.0   MCHC 36.0*   RDW 37.8   PLATELETCT 402   MPV 9.3     Recent Labs     09/13/24 1937   SODIUM 137   POTASSIUM 3.7   CHLORIDE 101   CO2 23   GLUCOSE 87   BUN 9   CREATININE 0.57   CALCIUM 9.2                     Imaging  DX-CHEST-PORTABLE (1 VIEW)   Final Result      No acute cardiac or pulmonary abnormalities are identified.          Assessment/Plan  * Dysphagia- (present on admission)  Assessment & Plan  Patient does have significant difficulty with dysphagia  Symptoms started 7 years ago, have been worsening  She has seen neurology, sees a specialist in myasthenia gravis next week  Patient is declining EGD  She was concerned about changes consistent with aspiration,  chest x-ray does not show anything acute  No need for antibiotics at this time  Since patient is declining EGD and has good outpatient follow-up including neurology specialist, no need for admission at this time  Patient agrees and understands the plan  I did tell her to be cognizant of fevers, cough, worsening shortness of breath    Weakness- (present on admission)  Assessment & Plan  Patient does have global weakness that is also working  Likely neurologic cause  No acute change  Outpatient follow-up with neurology

## 2024-09-14 NOTE — ASSESSMENT & PLAN NOTE
Patient does have global weakness that is also working  Likely neurologic cause  No acute change  Outpatient follow-up with neurology

## 2024-09-14 NOTE — ED NOTES
Medication history reviewed with patient. Med rec is complete.   Allergies reviewed.   Patient denied any outpatient antibiotics in the last 30 days.   Anticoagulants (rivaroxaban, apixaban, edoxaban, dabigatran, enoxaparin) taken in the last 14 days? No.        Brigida Skaggs, PharmD

## 2024-09-14 NOTE — ASSESSMENT & PLAN NOTE
Patient does have significant difficulty with dysphagia  Symptoms started 7 years ago, have been worsening  She has seen neurology, sees a specialist in myasthenia gravis next week  Patient is declining EGD  She was concerned about changes consistent with aspiration, chest x-ray does not show anything acute  No need for antibiotics at this time  Since patient is declining EGD and has good outpatient follow-up including neurology specialist, no need for admission at this time  Patient agrees and understands the plan  I did tell her to be cognizant of fevers, cough, worsening shortness of breath

## 2024-09-14 NOTE — ED NOTES
PT cleared for discharge home pt has no further questions or concerns  pt to f/u with pcp 1-2days pt verbalized understanding. pt advised to return to closest ED for any worsenign symptotms  Pt has f/u with neurologist this week has all the appropriate follow ups pt is agreeable with plan for DC home family at bedside

## 2024-09-14 NOTE — ED TRIAGE NOTES
Chief Complaint   Patient presents with    Chest Pain     Pt. Reports she has been dx with dysphagia. Reports she has had pain when swallowing and is having CP x2d after eating soup. Denies emesis, cough, known fevers. Endorses chills.      Physical Exam  Pulmonary:      Effort: Pulmonary effort is normal.   Skin:     General: Skin is warm and dry.   Neurological:      Mental Status: She is alert.       Pt. Reports she is being worked up for an auto-immune disease which is believed to be causing her dysphagia. Pt. States that there has not been a definitive diagnosis yet to identify which auto-immune disease she may have.

## 2024-09-14 NOTE — ED PROVIDER NOTES
ED Provider Note    CHIEF COMPLAINT  Chief Complaint   Patient presents with    Chest Pain     Pt. Reports she has been dx with dysphagia. Reports she has had pain when swallowing and is having CP x2d after eating soup. Denies emesis, cough, known fevers. Endorses chills.        EXTERNAL RECORDS REVIEWED  Outpatient Notes patient was seen at [facility] internal medicine in [place] 9/3/2020 for for dysphagia and was referred to the neurology clinic she also had hypokalemia and low vitamin D she has had the dysphagia for 9 years and has problems swallowing solids she did not have any evidence of demyelinating conditions or cervical spine problems they are concerned about an esophageal motility disorder and GI evaluation was recommended.  She is currently being treated with gabapentin and Flexeril and lorazepam she has been referred to [facility].    HPI/ROS  LIMITATION TO HISTORY   Select: : None  OUTSIDE HISTORIAN(S):  none    Bita Chaudhry is a 49 y.o. female who presents complaining of having progressively worsening dysphagia that has been longstanding over the last few years.  She has had MRIs and seen neurologist and they cannot figure out what is wrong.  They have referred her to GI but she has not yet seen anyone.  The patient states that since about 4:00 this afternoon she feels like none of the food or fluid is getting down past her midesophagus.  She states that everything she eats or drinks comes back up through her nose.  She is worried that she might have aspirated some of the soup she had earlier today.  She states that she took a gabapentin for her pain and thinks it is stuck in her esophagus.  She states she has a lot of pain with swallowing and is having trouble keeping her spit down.  She denies any fevers or chills cough or cold symptoms.  She reports weight loss.  She feels very dehydrated.    PAST MEDICAL HISTORY   has a past medical history of Dysphagia.    SURGICAL HISTORY  patient  denies any surgical history    FAMILY HISTORY  Family History   Problem Relation Age of Onset    Ovarian Cancer Mother     Cancer Father     Diabetes Paternal Uncle     Tubal Cancer Neg Hx     Peritoneal Cancer Neg Hx     Colorectal Cancer Neg Hx     Breast Cancer Neg Hx        SOCIAL HISTORY  Social History     Tobacco Use    Smoking status: Never    Smokeless tobacco: Never   Vaping Use    Vaping status: Never Used   Substance and Sexual Activity    Alcohol use: Not Currently     Comment: occ    Drug use: Yes     Types: Marijuana    Sexual activity: Yes     Partners: Male     Birth control/protection: [employer]       CURRENT MEDICATIONS  Home Medications    **Home medications have not yet been reviewed for this encounter**       Audit from Redirected Encounters    **Home medications have not yet been reviewed for this encounter**         ALLERGIES  Allergies   Allergen Reactions    Percocet [Perloxx]        PHYSICAL EXAM  VITAL SIGNS: BP (!) 133/94   Pulse (!) 104   Temp 37.1 °C (98.7 °F) (Temporal)   Resp 20   Ht 1.524 m (5')   Wt 46.6 kg (102 lb 11.8 oz)   LMP 09/06/2024 (Approximate)   SpO2 96%   BMI 20.06 kg/m²      Constitutional: Well developed, Well nourished, No acute distress, Non-toxic appearance.   HEENT: Normocephalic, Atraumatic,  external ears normal, pharynx pink,  Mucous  Membranes moist, No rhinorrhea or mucosal edema  Eyes: PERRL, EOMI, Conjunctiva normal, No discharge.   Neck: Normal range of motion, No tenderness, Supple, No stridor.   Lymphatic: No lymphadenopathy    Cardiovascular: Regular Rate and Rhythm, No murmurs,  rubs, or gallops.   Thorax & Lungs: Lungs clear to auscultation bilaterally, No respiratory distress, No wheezes, rhales or rhonchi, No chest wall tenderness.   Abdomen: Bowel sounds normal, Soft, non tender, non distended,  No pulsatile masses., no rebound guarding or peritoneal signs.   Skin: Warm, Dry, No erythema, No rash,   Back:  No CVA tenderness,  No  spinal tenderness, bony crepitance step offs or instability.   Extremities: Equal, intact distal pulses, No cyanosis, clubbing or edema,  No tenderness.   Musculoskeletal: Good range of motion in all major joints. No tenderness to palpation or major deformities noted.   Neurologic: Alert & oriented No focal deficits noted.  Psychiatric: Affect normal, Judgment normal, Mood normal.      EKG/LABS  Results for orders placed or performed during the hospital encounter of 09/13/24   CBC WITH DIFFERENTIAL   Result Value Ref Range    WBC 8.9 4.8 - 10.8 K/uL    RBC 4.16 (L) 4.20 - 5.40 M/uL    Hemoglobin 13.3 12.0 - 16.0 g/dL    Hematocrit 36.9 (L) 37.0 - 47.0 %    MCV 88.7 81.4 - 97.8 fL    MCH 32.0 27.0 - 33.0 pg    MCHC 36.0 (H) 32.2 - 35.5 g/dL    RDW 37.8 35.9 - 50.0 fL    Platelet Count 402 164 - 446 K/uL    MPV 9.3 9.0 - 12.9 fL    Neutrophils-Polys 47.00 44.00 - 72.00 %    Lymphocytes 42.30 (H) 22.00 - 41.00 %    Monocytes 7.40 0.00 - 13.40 %    Eosinophils 1.70 0.00 - 6.90 %    Basophils 1.40 0.00 - 1.80 %    Immature Granulocytes 0.20 0.00 - 0.90 %    Nucleated RBC 0.00 0.00 - 0.20 /100 WBC    Neutrophils (Absolute) 4.16 1.82 - 7.42 K/uL    Lymphs (Absolute) 3.75 1.00 - 4.80 K/uL    Monos (Absolute) 0.66 0.00 - 0.85 K/uL    Eos (Absolute) 0.15 0.00 - 0.51 K/uL    Baso (Absolute) 0.12 0.00 - 0.12 K/uL    Immature Granulocytes (abs) 0.02 0.00 - 0.11 K/uL    NRBC (Absolute) 0.00 K/uL   COMP METABOLIC PANEL   Result Value Ref Range    Sodium 137 135 - 145 mmol/L    Potassium 3.7 3.6 - 5.5 mmol/L    Chloride 101 96 - 112 mmol/L    Co2 23 20 - 33 mmol/L    Anion Gap 13.0 7.0 - 16.0    Glucose 87 65 - 99 mg/dL    Bun 9 8 - 22 mg/dL    Creatinine 0.57 0.50 - 1.40 mg/dL    Calcium 9.2 8.4 - 10.2 mg/dL    Correct Calcium 9.0 8.5 - 10.5 mg/dL    AST(SGOT) 15 12 - 45 U/L    ALT(SGPT) 8 2 - 50 U/L    Alkaline Phosphatase 63 30 - 99 U/L    Total Bilirubin 0.7 0.1 - 1.5 mg/dL    Albumin 4.3 3.2 - 4.9 g/dL    Total Protein 7.0 6.0  - 8.2 g/dL    Globulin 2.7 1.9 - 3.5 g/dL    A-G Ratio 1.6 g/dL   LIPASE   Result Value Ref Range    Lipase 25 11 - 82 U/L   HCG QUAL SERUM   Result Value Ref Range    Beta-Hcg Qualitative Serum Negative Negative   ESTIMATED GFR   Result Value Ref Range    GFR (CKD-EPI) 112 >60 mL/min/1.73 m 2   EKG   Result Value Ref Range    Report       [facility] Emergency Dept.    Test Date:  2024  Pt Name:    BITA SOTELO              Department: [facility]  MRN:        [id number]                      Room:  Gender:     Female                       Technician: KAYLEE  :        1975                   Requested By:ER TRIAGE PROTOCOL  Order #:    [id number]                    Reading MD: SEN TURNER MD    Measurements  Intervals                                Axis  Rate:       87                           P:          82  VT:         135                          QRS:        73  QRSD:       87                           T:          30  QT:         360  QTc:        433    Interpretive Statements  Sinus rhythm  Probable left atrial enlargement  No previous ECG available for comparison  Electronically Signed On 2024 19:28:47 PDT by SEN TURNER MD         I have independently interpreted this EKG    RADIOLOGY/PROCEDURES   I have independently interpreted the diagnostic imaging associated with this visit and am waiting the final reading from the radiologist.   My preliminary interpretation is as follows: cxr no infiltrate or pleural effusion    Radiologist interpretation:  DX-CHEST-PORTABLE (1 VIEW)   Final Result      No acute cardiac or pulmonary abnormalities are identified.          COURSE & MEDICAL DECISION MAKING    ASSESSMENT, COURSE AND PLAN  Care Narrative: Bita Sotelo is a 49 y.o. female who presents complaining of having progressively worsening dysphagia that has been longstanding over the last few years.  She has had MRIs and seen neurologist and they cannot figure out what  is wrong.  They have referred her to GI but she has not yet seen anyone.  The patient states that since about 4:00 this afternoon she feels like none of the food or fluid is getting down past her midesophagus.  She states that everything she eats or drinks comes back up through her nose.  She is worried that she might have aspirated some of the soup she had earlier today.  She states that she took a gabapentin for her pain and thinks it is stuck in her esophagus.  She states she has a lot of pain with swallowing and is having trouble keeping her spit down.  She denies any fevers or chills cough or cold symptoms.  She reports weight loss.  She feels very dehydrated.  On physical exam she is speaking full sentences she is very tearful sometimes she spits up or spit.'s are clear to auscultation bilaterally heart is tachycardic no murmurs rubs or gallops abdomen is soft and nontender she has no extremity edema.  His membranes are slightly dry.          Hydration: Based on the patient's presentation of Dehydration the patient was given IV fluids. IV Hydration was used because oral hydration was not adequate alone. Upon recheck following hydration, the patient was improved.          ADDITIONAL PROBLEMS MANAGED      DISPOSITION AND DISCUSSIONS    An IV was started and labs were drawn I gave the patient a liter of lactated Ringer's for rehydration.  I ordered a chest x-ray and blood work and have paged GI on-call Dr. Amezcua during her possible esophageal impaction.  Patient's white blood cell count is normal at 8.9 hemoglobin 13.3 platelet count 402 with a normal differential.  Comprehensive metabolic panel is normal electrolytes normal kidney function and normal liver function test.  Lipase is normal at 25.  Pregnancy test is negative.  Patient's EKG shows sinus rhythm with left atrial enlargement no acute ischemic changes.  Chest x-ray shows no infiltrate or pleural effusion.    Discussed the patient's case with GI   Maricruz who recommends admitting her to the hospital and he will perform an EGD in the morning for further evaluation of her dysphagia and esophageal pain    I spoke with the hospitalist who accepts her for admission.    I have discussed management of the patient with the following physicians and BRANDYN's:  Gi Dr Amezcua   Hospitalist Dr Bey    Discussion of management with other [facility] or appropriate source(s): None     Escalation of care considered, and ultimately not performed: none    Barriers to care at this time, including but not limited to:  none.     Decision tools and prescription drugs considered including, but not limited to:  none.    Pt will be admitted in guarded condition    FINAL DIAGNOSIS  1. Acute chest pain    2. Dysphagia, unspecified type         Electronically signed by: Sommer Mckay M.D., 9/13/2024 7:25 PM

## 2024-10-03 ENCOUNTER — HOSPITAL ENCOUNTER (OUTPATIENT)
Dept: LAB | Facility: MEDICAL CENTER | Age: 49
End: 2024-10-03
Attending: FAMILY MEDICINE
Payer: COMMERCIAL

## 2024-10-03 ENCOUNTER — OFFICE VISIT (OUTPATIENT)
Dept: MEDICAL GROUP | Facility: MEDICAL CENTER | Age: 49
End: 2024-10-03
Payer: COMMERCIAL

## 2024-10-03 VITALS
OXYGEN SATURATION: 98 % | DIASTOLIC BLOOD PRESSURE: 52 MMHG | HEART RATE: 115 BPM | BODY MASS INDEX: 19.24 KG/M2 | TEMPERATURE: 99 F | WEIGHT: 97.99 LBS | SYSTOLIC BLOOD PRESSURE: 100 MMHG | HEIGHT: 60 IN

## 2024-10-03 DIAGNOSIS — D71 CHRONIC GRANULOMATOUS DISEASE (HCC): ICD-10-CM

## 2024-10-03 DIAGNOSIS — R79.89 ABNORMAL CBC: ICD-10-CM

## 2024-10-03 DIAGNOSIS — R29.818 NEUROLOGIC ABNORMALITY: ICD-10-CM

## 2024-10-03 DIAGNOSIS — R13.10 DYSPHAGIA, UNSPECIFIED TYPE: ICD-10-CM

## 2024-10-03 DIAGNOSIS — M51.362 DEGENERATION OF INTERVERTEBRAL DISC OF LUMBAR REGION WITH DISCOGENIC BACK PAIN AND LOWER EXTREMITY PAIN: ICD-10-CM

## 2024-10-03 DIAGNOSIS — R53.83 OTHER FATIGUE: ICD-10-CM

## 2024-10-03 DIAGNOSIS — R59.0 AXILLARY LYMPHADENOPATHY: ICD-10-CM

## 2024-10-03 DIAGNOSIS — G62.9 NEUROPATHY: ICD-10-CM

## 2024-10-03 DIAGNOSIS — G89.29 OTHER CHRONIC PAIN: ICD-10-CM

## 2024-10-03 LAB
BASOPHILS # BLD AUTO: 0.2 % (ref 0–1.8)
BASOPHILS # BLD: 0.03 K/UL (ref 0–0.12)
CRP SERPL HS-MCNC: <0.3 MG/DL (ref 0–0.75)
EOSINOPHIL # BLD AUTO: 0 K/UL (ref 0–0.51)
EOSINOPHIL NFR BLD: 0 % (ref 0–6.9)
ERYTHROCYTE [DISTWIDTH] IN BLOOD BY AUTOMATED COUNT: 38.6 FL (ref 35.9–50)
ERYTHROCYTE [SEDIMENTATION RATE] IN BLOOD BY WESTERGREN METHOD: 7 MM/HOUR (ref 0–25)
FERRITIN SERPL-MCNC: 37.9 NG/ML (ref 10–291)
FOLATE SERPL-MCNC: 4.9 NG/ML
HCT VFR BLD AUTO: 40.6 % (ref 37–47)
HGB BLD-MCNC: 14.3 G/DL (ref 12–16)
IMM GRANULOCYTES # BLD AUTO: 0.07 K/UL (ref 0–0.11)
IMM GRANULOCYTES NFR BLD AUTO: 0.6 % (ref 0–0.9)
IRON SATN MFR SERPL: 21 % (ref 15–55)
IRON SERPL-MCNC: 68 UG/DL (ref 40–170)
LYMPHOCYTES # BLD AUTO: 2.15 K/UL (ref 1–4.8)
LYMPHOCYTES NFR BLD: 17.5 % (ref 22–41)
MCH RBC QN AUTO: 31.3 PG (ref 27–33)
MCHC RBC AUTO-ENTMCNC: 35.2 G/DL (ref 32.2–35.5)
MCV RBC AUTO: 88.8 FL (ref 81.4–97.8)
MONOCYTES # BLD AUTO: 0.8 K/UL (ref 0–0.85)
MONOCYTES NFR BLD AUTO: 6.5 % (ref 0–13.4)
NEUTROPHILS # BLD AUTO: 9.22 K/UL (ref 1.82–7.42)
NEUTROPHILS NFR BLD: 75.2 % (ref 44–72)
NRBC # BLD AUTO: 0 K/UL
NRBC BLD-RTO: 0 /100 WBC (ref 0–0.2)
PLATELET # BLD AUTO: 412 K/UL (ref 164–446)
PMV BLD AUTO: 9.5 FL (ref 9–12.9)
RBC # BLD AUTO: 4.57 M/UL (ref 4.2–5.4)
TIBC SERPL-MCNC: 321 UG/DL (ref 250–450)
UIBC SERPL-MCNC: 253 UG/DL (ref 110–370)
VIT B12 SERPL-MCNC: 542 PG/ML (ref 211–911)
WBC # BLD AUTO: 12.3 K/UL (ref 4.8–10.8)

## 2024-10-03 PROCEDURE — 85652 RBC SED RATE AUTOMATED: CPT

## 2024-10-03 PROCEDURE — 3078F DIAST BP <80 MM HG: CPT | Performed by: FAMILY MEDICINE

## 2024-10-03 PROCEDURE — 3074F SYST BP LT 130 MM HG: CPT | Performed by: FAMILY MEDICINE

## 2024-10-03 PROCEDURE — 86038 ANTINUCLEAR ANTIBODIES: CPT

## 2024-10-03 PROCEDURE — 82607 VITAMIN B-12: CPT

## 2024-10-03 PROCEDURE — 85025 COMPLETE CBC W/AUTO DIFF WBC: CPT

## 2024-10-03 PROCEDURE — 86140 C-REACTIVE PROTEIN: CPT

## 2024-10-03 PROCEDURE — 83550 IRON BINDING TEST: CPT

## 2024-10-03 PROCEDURE — 82728 ASSAY OF FERRITIN: CPT

## 2024-10-03 PROCEDURE — 86664 EPSTEIN-BARR NUCLEAR ANTIGEN: CPT

## 2024-10-03 PROCEDURE — 86663 EPSTEIN-BARR ANTIBODY: CPT

## 2024-10-03 PROCEDURE — 86665 EPSTEIN-BARR CAPSID VCA: CPT

## 2024-10-03 PROCEDURE — 82746 ASSAY OF FOLIC ACID SERUM: CPT

## 2024-10-03 PROCEDURE — 99214 OFFICE O/P EST MOD 30 MIN: CPT | Performed by: FAMILY MEDICINE

## 2024-10-03 PROCEDURE — 83540 ASSAY OF IRON: CPT

## 2024-10-03 PROCEDURE — 36415 COLL VENOUS BLD VENIPUNCTURE: CPT

## 2024-10-03 ASSESSMENT — ENCOUNTER SYMPTOMS
FEVER: 0
BACK PAIN: 1
ROS GI COMMENTS: DIFFICULTY SWALLOWING

## 2024-10-03 ASSESSMENT — FIBROSIS 4 INDEX: FIB4 SCORE: 0.63

## 2024-10-03 ASSESSMENT — PATIENT HEALTH QUESTIONNAIRE - PHQ9: CLINICAL INTERPRETATION OF PHQ2 SCORE: 0

## 2024-10-05 LAB
EBV EA-D IGG SER-ACNC: >150 U/ML (ref 0–10.9)
EBV NA IGG SER IA-ACNC: 564 U/ML (ref 0–21.9)
EBV VCA IGG SER IA-ACNC: 586 U/ML (ref 0–21.9)
EBV VCA IGM SER IA-ACNC: <10 U/ML (ref 0–43.9)

## 2024-10-06 LAB — NUCLEAR IGG SER QL IA: NORMAL

## 2024-10-18 ENCOUNTER — PATIENT MESSAGE (OUTPATIENT)
Dept: MEDICAL GROUP | Facility: MEDICAL CENTER | Age: 49
End: 2024-10-18
Payer: COMMERCIAL

## 2024-10-19 ENCOUNTER — PATIENT MESSAGE (OUTPATIENT)
Dept: MEDICAL GROUP | Facility: MEDICAL CENTER | Age: 49
End: 2024-10-19
Payer: COMMERCIAL

## 2024-10-21 RX ORDER — CYCLOBENZAPRINE HCL 10 MG
10 TABLET ORAL 2 TIMES DAILY PRN
Qty: 90 TABLET | Refills: 3 | Status: SHIPPED | OUTPATIENT
Start: 2024-10-21

## 2024-10-21 RX ORDER — GABAPENTIN 100 MG/1
100 CAPSULE ORAL 3 TIMES DAILY
Qty: 270 CAPSULE | Refills: 3 | Status: SHIPPED | OUTPATIENT
Start: 2024-10-21 | End: 2024-10-24

## 2024-10-24 ENCOUNTER — PATIENT MESSAGE (OUTPATIENT)
Dept: MEDICAL GROUP | Facility: MEDICAL CENTER | Age: 49
End: 2024-10-24
Payer: COMMERCIAL

## 2024-10-24 RX ORDER — GABAPENTIN 300 MG/1
300 CAPSULE ORAL 3 TIMES DAILY
Qty: 270 CAPSULE | Refills: 1 | Status: SHIPPED | OUTPATIENT
Start: 2024-10-24

## 2024-11-06 ENCOUNTER — APPOINTMENT (OUTPATIENT)
Dept: MEDICAL GROUP | Facility: MEDICAL CENTER | Age: 49
End: 2024-11-06
Payer: COMMERCIAL

## 2024-11-07 ENCOUNTER — APPOINTMENT (OUTPATIENT)
Dept: MEDICAL GROUP | Facility: MEDICAL CENTER | Age: 49
End: 2024-11-07
Payer: COMMERCIAL

## 2024-11-08 ENCOUNTER — OFFICE VISIT (OUTPATIENT)
Dept: MEDICAL GROUP | Facility: MEDICAL CENTER | Age: 49
End: 2024-11-08
Payer: COMMERCIAL

## 2024-11-08 VITALS
DIASTOLIC BLOOD PRESSURE: 76 MMHG | HEIGHT: 60 IN | SYSTOLIC BLOOD PRESSURE: 128 MMHG | OXYGEN SATURATION: 100 % | BODY MASS INDEX: 28.05 KG/M2 | HEART RATE: 88 BPM | TEMPERATURE: 97 F | WEIGHT: 142.86 LBS

## 2024-11-08 DIAGNOSIS — R79.89 ABNORMAL CBC: ICD-10-CM

## 2024-11-08 DIAGNOSIS — B27.90 EBV INFECTION: ICD-10-CM

## 2024-11-08 DIAGNOSIS — D71 CHRONIC GRANULOMATOUS DISEASE (HCC): ICD-10-CM

## 2024-11-08 PROCEDURE — 3078F DIAST BP <80 MM HG: CPT | Performed by: FAMILY MEDICINE

## 2024-11-08 PROCEDURE — 3074F SYST BP LT 130 MM HG: CPT | Performed by: FAMILY MEDICINE

## 2024-11-08 PROCEDURE — 99214 OFFICE O/P EST MOD 30 MIN: CPT | Performed by: FAMILY MEDICINE

## 2024-11-08 ASSESSMENT — ENCOUNTER SYMPTOMS
CHILLS: 0
FEVER: 0
HEADACHES: 1

## 2024-11-08 ASSESSMENT — FIBROSIS 4 INDEX: FIB4 SCORE: 0.62

## 2024-11-08 NOTE — PROGRESS NOTES
Verbal consent was acquired by the patient to use [employer] ambient listening note generation during this visit.      Jessy was seen today for follow-up.    Diagnoses and all orders for this visit:    Abnormal CBC  -     CBC WITH DIFFERENTIAL; Future    Chronic granulomatous disease (HCC)    EBV infection                  Assessment & Plan  1. Chronic Migraine/seizures/dysphagia.  Chronic condition, unstable.  She reports experiencing migraines triggered by light and sunlight, which intensify her seizures and muscle spasms. A referral to [facility] has been made for further evaluation and management of her migraines and associated symptoms.  She continues to experience seizures, which are exacerbated by her migraines. A referral to [facility] has been made to evaluate the neurological causes and to manage her seizures.  She reports persistent dysphagia, which may be related to her neurological condition. A referral to [facility] has been made to assess and manage this symptom.    2. Elevated White Blood Cell Count.  New condition, unstable her blood test showed an elevated white blood cell count, with neutrophils slightly elevated at 75 (normal range 44-72) and lymphocytes low. This may indicate inflammation or infection. A repeat blood test is recommended in 3 to 4 months to monitor the white cell count. If the white cell count remains high, a consultation with a hematologist will be considered.    3. Margy-Barr Virus Antibodies.  Chronic condition, stable, her blood test revealed the presence of Margy-Barr virus antibodies, indicating chronic mononucleosis. IgG antibodies suggest a chronic infection, while IgM, indicative of acute infection, was absent. No immediate treatment is required, but this will be monitored.    6. Calcified Lymph Nodes and Granulomatous Tissue.  Chronic condition, stable, her CT scan revealed calcified lymph nodes and granulomatous tissue, consistent with previous  findings.  Recommend following up with pulmonology.    FU as needed.          Chief complaint::Diagnoses of Abnormal CBC, Chronic granulomatous disease (HCC), and EBV infection were pertinent to this visit.      History of Present Illness  The patient is a 49-year-old female who presents for lab results, CT results, and medication follow-up.    She reports that gabapentin is effective in managing her symptoms. However, she is currently experiencing migraines, which are triggered by light and sunlight. She believes these migraines are related to her brain trauma and seizures, which intensify during these episodes.    She has a  high white blood cell count. She reports no symptoms of granulomatous disease and has never had skin problems. She has had a constant runny nose for the past 10 years.    She has a history of cervical cancer and is concerned about the possibility of other cancers, given her parents' history.    She is seeking an explanation for her dysphagia, which she suspects may be related to her brain trauma or injuries. She experienced a minor stroke, which resulted in numbness on the left side of her body. She reports no symptoms of urinary tract infection. Apart from her inability to eat, persistent dysphagia, muscle spasms, seizures, and migraines, she considers herself healthy.    She plans to consult a neurologist upon her return. She has been researching traumatic brain injuries and their potential to cause symptoms similar to multiple sclerosis.        Review of Systems   Constitutional:  Negative for chills and fever.   Neurological:  Positive for headaches.          Medications and Allergies:     Current Outpatient Medications   Medication Sig Dispense Refill    gabapentin (NEURONTIN) 300 MG Cap Take 1 Capsule by mouth 3 times a day. 270 Capsule 1    cyclobenzaprine (FLEXERIL) 10 mg Tab Take 1 Tablet by mouth 2 times a day as needed for Mild Pain, Moderate Pain or Muscle Spasms. 90 Tablet 3      No current facility-administered medications for this visit.       /76 (BP Location: Left arm, Patient Position: Sitting, BP Cuff Size: Adult)   Pulse 88   Temp 36.1 °C (97 °F) (Temporal)   Ht 1.524 m (5')   Wt 64.8 kg (142 lb 13.7 oz)   SpO2 100% , Body mass index is 27.9 kg/m².      Physical Exam  Constitutional:       Appearance: Normal appearance. She is well-developed and well-groomed.   HENT:      Head: Normocephalic and atraumatic.      Right Ear: External ear normal.      Left Ear: External ear normal.   Eyes:      General:         Right eye: No discharge.         Left eye: No discharge.      Conjunctiva/sclera: Conjunctivae normal.   Cardiovascular:      Rate and Rhythm: Normal rate.   Pulmonary:      Effort: Pulmonary effort is normal. No respiratory distress.   Musculoskeletal:      Cervical back: Neck supple.   Skin:     Findings: No rash.   Neurological:      Mental Status: She is alert.   Psychiatric:         Mood and Affect: Mood and affect normal.         Behavior: Behavior normal.            I reviewed with patient lab results resulted on 10/3/2024.        Please note that this dictation was created using voice recognition software. I have made every reasonable attempt to correct obvious errors, but I expect that there are errors of grammar and possibly content that I did not discover before finalizing the note.

## 2024-12-12 ENCOUNTER — APPOINTMENT (OUTPATIENT)
Dept: MEDICAL GROUP | Facility: MEDICAL CENTER | Age: 49
End: 2024-12-12
Payer: COMMERCIAL

## 2025-01-08 NOTE — TELEPHONE ENCOUNTER
Jennie Frederick, Med Ass't  Marleny Grijalva, Med Ass't  Caller: Unspecified (2 weeks ago)  Hey! I spoke with MAULIK monahan and did in fact reject the referral. Next office to send to would be either [facility] or [facility]. I will need a new referral though since the previous one has Dr Dang noted.   Thanks   Becca Dr Bloch would you place another referral to [facility] for pt, since MAULIK Monahan rejected referral please   Detail Level: Generalized Products Recommended: Elta MD General Sunscreen Counseling: I recommended a broad spectrum sunscreen with a SPF of 30 or higher specifically one with a zinc oxide. I explained that SPF 30 sunscreens block approximately 97 percent of the sun's harmful rays.  Sunscreens should be applied at least 15 minutes prior to expected sun exposure and then every 2 hours after that as long as sun exposure continues. If swimming or exercising sunscreen should be reapplied every 45 minutes to an hour after getting wet or sweating.  One ounce, or the equivalent of a shot glass full of sunscreen, is adequate to protect the skin not covered by a bathing suit. I also recommended a lip balm with a sunscreen as well. Sun protective clothing can be used in lieu of sunscreen but must be worn the entire time you are exposed to the sun's rays. General Sunscreen Counseling: I recommended a broad spectrum sunscreen with a SPF of 30 or higher specifically one with a zinc oxide. I explained that SPF 30 sunscreens block approximately 97 percent of the sun's harmful rays.  Sunscreens should be applied at least 15 minutes prior to expected sun exposure and then every 2 hours after that as long as sun exposure continues. If swimming or exercising sunscreen should be reapplied every 45 minutes to an hour after getting wet or sweating.  One ounce, or the equivalent of a shot glass full of sunscreen, is adequate to protect the skin not covered by a bathing suit. I also recommended a lip balm with a sunscreen as well. Sun protective clothing can be used in lieu of sunscreen but must be worn the entire time you are exposed to the sun's rays. Advised patient to avoid tanning beds. Discussed dangers and increased risk for melanoma. Patient understands Detail Level: Zone

## 2025-01-14 ENCOUNTER — APPOINTMENT (OUTPATIENT)
Dept: MEDICAL GROUP | Facility: MEDICAL CENTER | Age: 50
End: 2025-01-14
Payer: COMMERCIAL

## 2025-01-15 ENCOUNTER — APPOINTMENT (OUTPATIENT)
Dept: MEDICAL GROUP | Facility: MEDICAL CENTER | Age: 50
End: 2025-01-15
Payer: COMMERCIAL

## 2025-01-22 ENCOUNTER — APPOINTMENT (OUTPATIENT)
Dept: MEDICAL GROUP | Facility: MEDICAL CENTER | Age: 50
End: 2025-01-22
Payer: COMMERCIAL

## 2025-01-23 ENCOUNTER — APPOINTMENT (OUTPATIENT)
Dept: MEDICAL GROUP | Facility: MEDICAL CENTER | Age: 50
End: 2025-01-23
Payer: COMMERCIAL

## 2025-01-30 ENCOUNTER — APPOINTMENT (OUTPATIENT)
Dept: MEDICAL GROUP | Facility: MEDICAL CENTER | Age: 50
End: 2025-01-30
Payer: COMMERCIAL

## 2025-02-05 ENCOUNTER — APPOINTMENT (OUTPATIENT)
Dept: MEDICAL GROUP | Facility: MEDICAL CENTER | Age: 50
End: 2025-02-05
Payer: COMMERCIAL

## 2025-02-27 ENCOUNTER — APPOINTMENT (OUTPATIENT)
Dept: MEDICAL GROUP | Facility: MEDICAL CENTER | Age: 50
End: 2025-02-27
Payer: COMMERCIAL

## 2025-02-28 ENCOUNTER — APPOINTMENT (OUTPATIENT)
Dept: MEDICAL GROUP | Facility: MEDICAL CENTER | Age: 50
End: 2025-02-28
Payer: COMMERCIAL